# Patient Record
Sex: FEMALE | Race: WHITE | NOT HISPANIC OR LATINO | Employment: UNEMPLOYED | URBAN - NONMETROPOLITAN AREA
[De-identification: names, ages, dates, MRNs, and addresses within clinical notes are randomized per-mention and may not be internally consistent; named-entity substitution may affect disease eponyms.]

---

## 2017-01-01 ENCOUNTER — OFFICE VISIT (OUTPATIENT)
Dept: ONCOLOGY | Facility: CLINIC | Age: 62
End: 2017-01-01

## 2017-01-01 ENCOUNTER — HOSPITAL ENCOUNTER (OUTPATIENT)
Dept: OTHER | Facility: HOSPITAL | Age: 62
Setting detail: RADIATION/ONCOLOGY SERIES
Discharge: HOME | End: 2017-01-20
Attending: INTERNAL MEDICINE | Admitting: INTERNAL MEDICINE

## 2017-01-01 ENCOUNTER — INFUSION (OUTPATIENT)
Dept: ONCOLOGY | Facility: HOSPITAL | Age: 62
End: 2017-01-01

## 2017-01-01 ENCOUNTER — APPOINTMENT (OUTPATIENT)
Dept: ONCOLOGY | Facility: HOSPITAL | Age: 62
End: 2017-01-01

## 2017-01-01 VITALS
DIASTOLIC BLOOD PRESSURE: 55 MMHG | WEIGHT: 112.1 LBS | TEMPERATURE: 98.8 F | RESPIRATION RATE: 20 BRPM | HEART RATE: 80 BPM | SYSTOLIC BLOOD PRESSURE: 102 MMHG

## 2017-01-01 DIAGNOSIS — C50.419 MALIGNANT NEOPLASM OF UPPER-OUTER QUADRANT OF FEMALE BREAST, UNSPECIFIED LATERALITY: Primary | ICD-10-CM

## 2017-01-01 DIAGNOSIS — C50.419 MALIGNANT NEOPLASM OF UPPER-OUTER QUADRANT OF FEMALE BREAST, UNSPECIFIED LATERALITY: ICD-10-CM

## 2017-01-01 PROCEDURE — 99213 OFFICE O/P EST LOW 20 MIN: CPT | Performed by: INTERNAL MEDICINE

## 2017-01-01 PROCEDURE — G0463 HOSPITAL OUTPT CLINIC VISIT: HCPCS | Performed by: INTERNAL MEDICINE

## 2017-01-12 PROBLEM — C50.419 MALIGNANT NEOPLASM OF UPPER-OUTER QUADRANT OF FEMALE BREAST (HCC): Status: ACTIVE | Noted: 2017-01-01

## 2017-02-02 NOTE — PROGRESS NOTES
DATE OF VISIT: 2/2/2017    REASON FOR VISIT:  Metastatic left breast cancer    HISTORY OF PRESENT ILLNESS:   61-year-old female with a past medical history significant for left breast cancer initially was diagnosed in 1990 status post mastectomy followed by tamoxifen.  She had a recurrence in 1992 followed by 2001 and 2002 and patient has been through multiple chemotherapy and hormonal therapy.  Patient was last seen in clinic on November 7, 2016 on that day due to progression of the disease on the PET scan it was recommended that she started on Everolimus with exemestane.  But she decided not to do any more kind of therapy for her breast cancer.  She was still getting her Xgeva every month.  Yesterday she was in the emergency room with episode of hypotension and dizziness.  She feels better today.  Denies any blood in the stool or urine.  Denies any dizziness.    PAST MEDICAL HISTORY:    Past Medical History   Diagnosis Date   • Bony metastasis    • Malignant neoplasm of upper-outer quadrant of female breast    • Personal history of breast cancer        SOCIAL HISTORY:    Social History   Substance Use Topics   • Smoking status: Never Smoker   • Smokeless tobacco: None   • Alcohol use No       Surgical History :  No past surgical history on file.    ALLERGIES:    Allergies   Allergen Reactions   • Penicillins Anaphylaxis   • Imferon [Iron Dextran]    • Interferon Alfacon-1 Hives   • Levaquin [Levofloxacin]    • Procardia [Nifedipine]        REVIEW OF SYSTEMS:      CONSTITUTIONAL: Positive for fatigue.  No fever, chills, or night sweats.     HEENT:  No epistaxis, mouth sores, or difficulty swallowing.    RESPIRATORY:  Complains of shortness of breath with minimal exertion.  No cough or hemoptysis.    CARDIOVASCULAR:  No chest pain or palpitations.    GASTROINTESTINAL:  No abdominal pain, nausea, vomiting, or blood in the stool.    GENITOURINARY:  No dysuria or hematuria.    MUSCULOSKELETAL:  Complains of chronic  back pain and hip discomfort secondary to bony metastasis.    NEUROLOGICAL:  No tingling or numbness. No new headache or dizziness.     LYMPHATICS:  Denies any abnormal swollen and anywhere in the body.    SKIN:  Denies any new skin rash.    PHYSICAL EXAMINATION:      VITAL SIGNS:    Visit Vitals   • /55   • Pulse 80   • Temp 98.8 °F (37.1 °C)   • Resp 20   • Wt 112 lb 1.6 oz (50.8 kg)       GENERAL:  Not in any distress.  Appears cachectic.    EYES:  Mild pallor. No icterus.    NECK:  No adenopathy.    RESPIRATORY:  Fair air entry bilaterally. No rhonchi or wheezing.    CARDIOVASCULAR:  S1, S2. Regular rate and rhythm.    ABDOMEN:  Soft, nontender. Bowel sounds present.    EXTREMITIES:  No edema.    NEUROLOGICAL:  Alert, awake, oriented x3.     DIAGNOSTIC DATA:    Patient had blood work done in the emergency room yesterday on February 1, 2017, it showed WBC were 5.5 hemoglobin was 8.9 and platelets were 110.  Creatinine was normal at 1 liver function 2 showed AST was elevated at 53 alkaline phosphatase was 257 and ALT was 38.      ASSESSMENT AND PLAN:      1.  Metastatic left breast cancer with extensive bony metastases and liver metastasis.  Patient was initially diagnosed in 1990, at that point should a mastectomy and hormonal therapy.  Patient is means.  More recurrence in 1992 followed by 2002 and 2004 patient has been through multiple line of chemotherapy consisting of Adriamycin, Cytoxan, Taxol, 5-FU, as well as multiple line of hormonal therapy consisting of Arimidex, Femara, Faslodex, tamoxifen.  Last  Clinic visit in November it was recommended that she start on Everolimus with exemestane.  But she decided she does not want to do any more kind of therapy for her breast cancer.  She also decided today that she doesn't want to take Xgeva anymore as well.  States it was recommended to her to consider palliative care or hospice care at this point.  She believes she is not ready at this point for that.    she'll be given appointment to come back on when necessary basis as needed.  She is getting pain medication from her primary medical doctor.    2.  Health maintenance: Patient does not smoke.  She is not a candidate for further screening colonoscopy.       Liborio San MD  2/2/2017  5:13 PM